# Patient Record
Sex: MALE | Race: WHITE | NOT HISPANIC OR LATINO | Employment: FULL TIME | URBAN - METROPOLITAN AREA
[De-identification: names, ages, dates, MRNs, and addresses within clinical notes are randomized per-mention and may not be internally consistent; named-entity substitution may affect disease eponyms.]

---
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
## 2022-04-01 DIAGNOSIS — L40.0 PLAQUE PSORIASIS: Primary | ICD-10-CM

## 2022-04-01 RX ORDER — IXEKIZUMAB 80 MG/ML
INJECTION, SOLUTION SUBCUTANEOUS
Qty: 2 ML | Refills: 3 | Status: SHIPPED | OUTPATIENT
Start: 2022-04-01 | End: 2023-04-26

## 2022-04-01 NOTE — LETTER
April 1, 2022      Ahmet Hopper  [address] [address] [place] [id number]        To whom it may concern,    We have attempted to schedule Ahmet for a follow up with Dr. Cortez. Unfortunately, we have not been able to reach you. If you would like to schedule an appointment please contact me directly at [phone].    Thank you and hope you are staying well.     Sincerely,  Nancie Page   Pediatric Dermatology Clinic  [phone]     [facility]  eMERGENCY dEPARTMENT eNCOUnter          CHIEF COMPLAINT       Chief Complaint   Patient presents with    Arm Injury     right       Nurses Notes reviewed and I agree except as noted in the HPI. HISTORY OF PRESENT ILLNESS    Xuan Rehman is a 3 y.o. female who presents to the Emergency Department for the evaluation of right arm injury. The patient's mother reported the patient hit her arm on a dresser. The mother reported pain and swelling to the patient's right wrist. The mother reported that patient has continually cried. The patient has medical history of sickle cell trait. The patient has no further acute complaints at this time. REVIEW OF SYSTEMS     Review of Systems   Musculoskeletal: Positive for arthralgias (right wrist) and joint swelling (right wrist). PAST MEDICAL HISTORY    has a past medical history of Sickle cell trait ([facility] [address] SURGICAL HISTORY      has no past surgical history on file. CURRENT MEDICATIONS       Discharge Medication List as of 2/14/2020  7:21 PM      CONTINUE these medications which have NOT CHANGED    Details   acetaminophen (TYLENOL) 160 MG/5ML liquid Take 15 mg/kg by mouth every 4 hours as needed for FeverHistorical Med             ALLERGIES     has No Known Allergies. FAMILY HISTORY     She indicated that her mother is alive. She indicated that her father is alive. family history includes Diabetes in her mother; High Blood Pressure in her mother; Other in her father and mother. SOCIAL HISTORY      reports that she is a non-smoker but has been exposed to tobacco smoke. She has never used smokeless tobacco. She reports that she does not drink alcohol or use drugs. PHYSICAL EXAM     INITIAL VITALS:  weight is 29 lb 2 oz (13.2 kg). Her oral temperature is 98.7 °F (37.1 °C). Her pulse is 120. Her respiration is 20 and oxygen saturation is 97%. Physical Exam  Constitutional:       General: She is active and playful. discharge    PATIENT REFERREDTO:  Alannah De Los Santos [place] [phone]  Go to   tomorrow for walk in clinic      DISCHARGE MEDICATIONS:  Discharge Medication List as of 2/14/2020  7:21 PM          (Please note that portions of this note were completed with a voice recognition program.  Efforts were made to edit the dictations but occasionally words are mis-transcribed.)    The patient was given an opportunity to see the Emergency Attending. The patient voiced understanding that I was a Mid-Level Provider and was in agreement with being seen independently by myself. Scribe:  Anthony Miller 2/14/20 6:55 PM Scribing for and in the presence of Kacie Gilbert.     Signed by: Rosio Bowen, 02/15/20 3:44 PM    Provider:  I personally performed the services described in the documentation, reviewed and edited the documentation which was dictated to the scribe in my presence, and it accurately records my words and actions.     Andres Ding PA-C 2/14/20 3:44 PM    SUJATHA Meyers PA-C  02/15/20 1544

## 2022-04-01 NOTE — TELEPHONE ENCOUNTER
Refill request received from patient's pharmacy for Taltz. Pt was last seen on 10/26/21 with Dr. Cortez, NO follow up scheduled currently. Order pended to Dr. Cortez for review.    RN also sending request to peds derm  to reach out to patient and schedule follow up. Will need labs at that visit.

## 2022-05-02 ENCOUNTER — TELEPHONE (OUTPATIENT)
Dept: DERMATOLOGY | Facility: CLINIC | Age: 22
End: 2022-05-02

## 2022-05-02 NOTE — TELEPHONE ENCOUNTER
Prior Authorization Approval    Authorization Effective Date: 5/3/2022  Authorization Expiration Date: 5/3/2023  Medication: Taltz- Approved  Approved Dose/Quantity: 1 per 28 days  Reference #: [id number]   Insurance Company: [employer] - Phone [phone] Fax [phone]  Expected CoPay:       CoPay Card Available:      Foundation Assistance Needed:    Which Pharmacy is filling the prescription (Not needed for infusion/clinic administered): [facility] - [place] [address]  Pharmacy Notified: Yes  Patient Notified: Yes

## 2022-05-14 ENCOUNTER — HEALTH MAINTENANCE LETTER (OUTPATIENT)
Age: 22
End: 2022-05-14

## 2022-08-09 ENCOUNTER — VIRTUAL VISIT (OUTPATIENT)
Dept: DERMATOLOGY | Facility: CLINIC | Age: 22
End: 2022-08-09
Attending: DERMATOLOGY
Payer: COMMERCIAL

## 2022-08-09 DIAGNOSIS — Z51.81 MEDICATION MONITORING ENCOUNTER: ICD-10-CM

## 2022-08-09 DIAGNOSIS — L40.0 PLAQUE PSORIASIS: Primary | ICD-10-CM

## 2022-08-09 PROCEDURE — 99214 OFFICE O/P EST MOD 30 MIN: CPT | Mod: 95 | Performed by: DERMATOLOGY

## 2022-08-09 NOTE — PATIENT INSTRUCTIONS
[facility]- Pediatric Dermatology  Dr. Cammie Cortez, Dr. Gabriele Abrams, Dr. Iris Alonzo, Dr. Isabella Culver, CHAD Mccoy Dr[address] Dr. Agueda Skelton    Non Urgent  Nurse Triage Line; [phone]- Lillian and Paulette CUELLO Care Coordinators    Nancie (/Complex ) [phone]    If you need a prescription refill, please contact your pharmacy. Refills are approved or denied by our Physicians during normal business hours, Monday through Fridays  Per office policy, refills will not be granted if you have not been seen within the past year (or sooner depending on your child's condition)      Scheduling Information:   Pediatric Appointment Scheduling and Call Center [phone]   Radiology Scheduling- [phone]   Sedation Unit Scheduling- [phone]  Main  Services: [phone]   Sami: [phone]   Pakistani: [phone]   Hmong/Swazi/Gabriel: [phone]    Preadmission Nursing Department Fax Number: [phone] (Fax all pre-operative paperwork to this number)      For urgent matters arising during evenings, weekends, or holidays that cannot wait for normal business hours please call [phone] and ask for the Dermatology Resident On-Call to be paged.

## 2022-08-09 NOTE — PROGRESS NOTES
[facility] (Store and Forward ((National Emergency Concerning the CORONAVIRUS (COVID 19) )    Image quality and interpretability: acceptable    Physician has received verbal consent for a Video/Photos Visit from the patient? Yes    In-person dermatology visit recommendation: no    Dermatology Problem List:    1. Psoriasis vulgaris, no psoriatic arthritis     - Taltz since 4/2021.  Last quant gold was 4/2021   - previously used many topicals  - Prior: methotrexate, Enbrel, Stelara (3/2018-4/2021)    Chief Complaint:  Follow up psoriasis     HPI:  Ahmet is a 21 year old male who I know well for his psoriasis vulgaris. Last seen 10 months ago at which time his skin was clear on Taltz.  He continues to do extremely well without lesions on the skin, in fact he hasn't had an injection in 2 months because he keeps forgetting to give it.     Denies arthritis or any new skin issues    Past Med Hx:  Unchanged    ROS: 12 point ROS is negative     Medications:  Current Outpatient Medications   Medication     fluocinolone acetonide (DERMA-SMOOTHE/FS BODY) 0.01 % external oil     lidocaine (XYLOCAINE) 5 % external ointment     lisdexamfetamine (VYVANSE) 70 MG capsule     TALTZ 80 MG/ML SOAJ auto-injector     clobetasol (TEMOVATE) 0.05 % external solution     ixekizumab (TALTZ) 80 MG/ML SOAJ auto-injector     ixekizumab (TALTZ) 80 MG/ML SOAJ auto-injector     Secukinumab, 300 MG Dose, 150 MG/ML SOAJ     tacrolimus (PROTOPIC) 0.03 % ointment     ustekinumab (STELARA) 45 MG/0.5ML SOSY     ustekinumab (STELARA) 45 MG/0.5ML SOSY     ustekinumab (STELARA) 90 MG/ML     No current facility-administered medications for this visit.         Allergies:  No Known Allergies      Social History:       Physical Exam  There were no vitals taken for this visit.  Psych: alert and oriented, pleasant mood  Skin: exam via photos  Clear scalp, trunk and extremities, face    Assessment and Plan:   1. Psoriasis, plaque type-  dramatic improvement/clearance on Taltz.   2. On biologic therapy/medication monitoring   quant gold, CBC, LFTs are due: ordered today to be done at local lab    Follow up in 1 year, sooner if needed       Cammie Cortez MD  , Pediatric Dermatology    CC: Keis, Mary Ann T  [facility] [address] [place] [address]

## 2022-08-09 NOTE — LETTER
8/9/2022      RE: Ahmet Hopper  [address] [address] [place] [address]     Dear Colleague,    Thank you for the opportunity to participate in the care of your patient, Ahmet Hopper, at the [facility] at [facility]. Please see a copy of my visit note below.                                              [facility] Forward ((National Emergency Concerning the CORONAVIRUS (COVID 19) )    Image quality and interpretability: acceptable    Physician has received verbal consent for a Video/Photos Visit from the patient? Yes    In-person dermatology visit recommendation: no    Dermatology Problem List:    1. Psoriasis vulgaris, no psoriatic arthritis     - Taltz since 4/2021.  Last quant gold was 4/2021   - previously used many topicals  - Prior: methotrexate, Enbrel, Stelara (3/2018-4/2021)    Chief Complaint:  Follow up psoriasis     HPI:  Ahmet is a 21 year old male who I know well for his psoriasis vulgaris. Last seen 10 months ago at which time his skin was clear on Taltz.  He continues to do extremely well without lesions on the skin, in fact he hasn't had an injection in 2 months because he keeps forgetting to give it.     Denies arthritis or any new skin issues    Past Med Hx:  Unchanged    ROS: 12 point ROS is negative     Medications:  Current Outpatient Medications   Medication     fluocinolone acetonide (DERMA-SMOOTHE/FS BODY) 0.01 % external oil     lidocaine (XYLOCAINE) 5 % external ointment     lisdexamfetamine (VYVANSE) 70 MG capsule     TALTZ 80 MG/ML SOAJ auto-injector     clobetasol (TEMOVATE) 0.05 % external solution     ixekizumab (TALTZ) 80 MG/ML SOAJ auto-injector     ixekizumab (TALTZ) 80 MG/ML SOAJ auto-injector     Secukinumab, 300 MG Dose, 150 MG/ML SOAJ     tacrolimus (PROTOPIC) 0.03 % ointment     ustekinumab (STELARA) 45 MG/0.5ML SOSY     ustekinumab  (STELARA) 45 MG/0.5ML SOSY     ustekinumab (STELARA) 90 MG/ML     No current facility-administered medications for this visit.         Allergies:  No Known Allergies      Social History:       Physical Exam  There were no vitals taken for this visit.  Psych: alert and oriented, pleasant mood  Skin: exam via photos  Clear scalp, trunk and extremities, face    Assessment and Plan:   1. Psoriasis, plaque type- dramatic improvement/clearance on Taltz.   2. On biologic therapy/medication monitoring   quant gold, CBC, LFTs are due: ordered today to be done at local lab    Follow up in 1 year, sooner if needed       Cammie Cortez MD  , Pediatric Dermatology    CC: Keis, Mary Ann T  [facility] [address] [place] [address]

## 2022-08-09 NOTE — NURSING NOTE
Ahmet Hopper is a 21 year old male who is being evaluated via a billable telephone visit.      Ahmet Hopper complains of    Chief Complaint   Patient presents with     Teledermatology.     Psoriasis.       Patient is located in [place]? Yes     I have reviewed and updated the patient's medication list, allergies and preferred pharmacy.      Pediatric Dermatology- Review of Systems Questions (return patient)          Goal for today's visit? Follow Up.     IN THE LAST 2 WEEKS     Fever- no     Mouth/Throat Sores- no/no     Weight Gain/Loss - no/no     Cough/Wheezing- yes/no     Change in Appetite- no     Chest Discomfort/Heartburn - no/no     Bone Pain- no     Nausea/Vomiting - no/no     Joint Pain/Swelling - no/no     Constipation/Diarrhea - no/no     Headaches/Dizziness/Change in Vision- no/no/no     Pain with Urination- no     Ear Pain/Hearing Loss- no/no     Nasal Discharge/Bleeding- yes/no     Sadness/Irritability- no/no     Anxiety/Moodiness-no/no       Mitch Calle, [facility]

## 2022-09-04 ENCOUNTER — HEALTH MAINTENANCE LETTER (OUTPATIENT)
Age: 22
End: 2022-09-04

## 2023-01-21 ENCOUNTER — HEALTH MAINTENANCE LETTER (OUTPATIENT)
Age: 23
End: 2023-01-21

## 2023-04-26 DIAGNOSIS — L40.0 PLAQUE PSORIASIS: ICD-10-CM

## 2023-04-26 RX ORDER — IXEKIZUMAB 80 MG/ML
INJECTION, SOLUTION SUBCUTANEOUS
Qty: 2 ML | Refills: 3 | OUTPATIENT
Start: 2023-04-26

## 2023-04-28 ENCOUNTER — TELEPHONE (OUTPATIENT)
Dept: DERMATOLOGY | Facility: CLINIC | Age: 23
End: 2023-04-28
Payer: COMMERCIAL

## 2023-04-28 NOTE — TELEPHONE ENCOUNTER
[facility] Call Center    Phone Message    May a detailed message be left on voicemail: no     Reason for Call: Medication Question or concern regarding medication   Prescription Clarification  Name of Medication: ixekizumab (TALTZ) 80 MG/ML SOAJ auto-injector  Prescribing Provider: Dr Cortez   Pharmacy: [facility] on file   [phone]   What on the order needs clarification? Patient hasn't had this medication filled since 10/2022, does provider want to start patient with loading dose, or just start with maintenance? Please advise. Thanks!          Action Taken: Message routed to:  Other: Peds Derm [facility] [place]    Travel Screening: Not Applicable

## 2023-04-28 NOTE — TELEPHONE ENCOUNTER
PA Initiation    Medication: Taltz  Insurance Company:    Pharmacy Filling the Rx:    Filling Pharmacy Phone:    Filling Pharmacy Fax:    Start Date: 4/28/2023

## 2023-05-02 NOTE — TELEPHONE ENCOUNTER
Unclear if PA renewal request crossed over to plan. Sent request again just to be sure.

## 2023-05-02 NOTE — TELEPHONE ENCOUNTER
Cammie Cortez MD  [facility] Peds Dermatology [facility] 14 hours ago (5:34 PM)     IP  maintenance      Contacted [facility] mail order pharmacy spoke to Allyssa, call transferred to specialty group. Spoke to Best, to provide advisement from Dr. Cortez. Best verbalized understanding and denied questions or concerns.

## 2023-05-04 ENCOUNTER — LAB (OUTPATIENT)
Dept: LAB | Facility: CLINIC | Age: 23
End: 2023-05-04
Payer: COMMERCIAL

## 2023-05-04 DIAGNOSIS — L40.0 PLAQUE PSORIASIS: ICD-10-CM

## 2023-05-04 LAB
ALBUMIN SERPL BCG-MCNC: 4.7 G/DL (ref 3.5–5.2)
ALP SERPL-CCNC: 118 U/L (ref 40–129)
ALT SERPL W P-5'-P-CCNC: 15 U/L (ref 10–50)
AST SERPL W P-5'-P-CCNC: 32 U/L (ref 10–50)
BASOPHILS # BLD AUTO: 0 10E3/UL (ref 0–0.2)
BASOPHILS NFR BLD AUTO: 0 %
BILIRUB DIRECT SERPL-MCNC: <0.2 MG/DL (ref 0–0.3)
BILIRUB SERPL-MCNC: 0.4 MG/DL
EOSINOPHIL # BLD AUTO: 0.1 10E3/UL (ref 0–0.7)
EOSINOPHIL NFR BLD AUTO: 2 %
ERYTHROCYTE [DISTWIDTH] IN BLOOD BY AUTOMATED COUNT: 12 % (ref 10–15)
HCT VFR BLD AUTO: 39.9 % (ref 40–53)
HGB BLD-MCNC: 13.7 G/DL (ref 13.3–17.7)
IMM GRANULOCYTES # BLD: 0 10E3/UL
IMM GRANULOCYTES NFR BLD: 0 %
LYMPHOCYTES # BLD AUTO: 1.8 10E3/UL (ref 0.8–5.3)
LYMPHOCYTES NFR BLD AUTO: 27 %
MCH RBC QN AUTO: 30.6 PG (ref 26.5–33)
MCHC RBC AUTO-ENTMCNC: 34.3 G/DL (ref 31.5–36.5)
MCV RBC AUTO: 89 FL (ref 78–100)
MONOCYTES # BLD AUTO: 0.5 10E3/UL (ref 0–1.3)
MONOCYTES NFR BLD AUTO: 7 %
NEUTROPHILS # BLD AUTO: 4.4 10E3/UL (ref 1.6–8.3)
NEUTROPHILS NFR BLD AUTO: 64 %
PLATELET # BLD AUTO: 195 10E3/UL (ref 150–450)
PROT SERPL-MCNC: 7.3 G/DL (ref 6.4–8.3)
RBC # BLD AUTO: 4.47 10E6/UL (ref 4.4–5.9)
WBC # BLD AUTO: 6.8 10E3/UL (ref 4–11)

## 2023-05-04 PROCEDURE — 85025 COMPLETE CBC W/AUTO DIFF WBC: CPT

## 2023-05-04 PROCEDURE — 86481 TB AG RESPONSE T-CELL SUSP: CPT

## 2023-05-04 PROCEDURE — 36415 COLL VENOUS BLD VENIPUNCTURE: CPT

## 2023-05-04 PROCEDURE — 80076 HEPATIC FUNCTION PANEL: CPT

## 2023-05-06 LAB
QUANTIFERON MITOGEN: 10 IU/ML
QUANTIFERON NIL TUBE: 0.02 IU/ML
QUANTIFERON TB1 TUBE: 0.1 IU/ML
QUANTIFERON TB2 TUBE: 0.12

## 2023-05-07 LAB
GAMMA INTERFERON BACKGROUND BLD IA-ACNC: 0.02 IU/ML
M TB IFN-G BLD-IMP: NEGATIVE
M TB IFN-G CD4+ BCKGRND COR BLD-ACNC: 9.98 IU/ML
MITOGEN IGNF BCKGRD COR BLD-ACNC: 0.08 IU/ML
MITOGEN IGNF BCKGRD COR BLD-ACNC: 0.1 IU/ML

## 2023-08-28 ENCOUNTER — OFFICE VISIT (OUTPATIENT)
Dept: DERMATOLOGY | Facility: CLINIC | Age: 23
End: 2023-08-28
Attending: DERMATOLOGY
Payer: COMMERCIAL

## 2023-08-28 VITALS — HEIGHT: 69 IN | WEIGHT: 118.61 LBS | BODY MASS INDEX: 17.57 KG/M2

## 2023-08-28 DIAGNOSIS — Z79.899 ENCOUNTER FOR LONG-TERM (CURRENT) USE OF HIGH-RISK MEDICATION: Primary | ICD-10-CM

## 2023-08-28 DIAGNOSIS — L40.0 PLAQUE PSORIASIS: ICD-10-CM

## 2023-08-28 DIAGNOSIS — Z51.81 MEDICATION MONITORING ENCOUNTER: ICD-10-CM

## 2023-08-28 PROCEDURE — 99214 OFFICE O/P EST MOD 30 MIN: CPT | Performed by: DERMATOLOGY

## 2023-08-28 ASSESSMENT — PAIN SCALES - GENERAL: PAINLEVEL: NO PAIN (0)

## 2023-08-28 NOTE — NURSING NOTE
"[facility] [[id number]]  Chief Complaint   Patient presents with    RECHECK     [facility] return-follow up for plaque psoriasis      Initial Ht 5' 9\" (175.3 cm)   Wt 118 lb 9.7 oz (53.8 kg)   BMI 17.52 kg/m   Estimated body mass index is 17.52 kg/m  as calculated from the following:    Height as of this encounter: 5' 9\" (175.3 cm).    Weight as of this encounter: 118 lb 9.7 oz (53.8 kg).  Medication Reconciliation: complete    Does the patient need any medication refills today? No    Does the patient/parent need MyChart or Proxy acces today? No    Hope Menendez LPN     "

## 2023-08-28 NOTE — LETTER
"8/28/2023      RE: Ahmet Hopper  [address] [address] [place] [address]     Dear Colleague,    Thank you for the opportunity to participate in the care of your patient, Ahmet Hopper, at the [facility] PEDIATRIC SPECIALTY CLINIC at [facility]. Please see a copy of my visit note below.    [facility] Dermatology Note  Encounter Date: Aug 28, 2023  Office Visit     Dermatology Problem List:  1. Psoriasis    ____________________________________________    Assessment & Plan:    # Psoriasis.   - Continue Taltz (80mg) subcutaneously every 28 days  - Continue to monitor flares at home, return to clinic if flares recur  #medication monitoring  Labs ordered prior to visit: normal      Procedures Performed:   None    Follow-up in the future as needed- plans to transition to adult dermatology closer to home    Staff and Medical Student:     Med student: Libertad Charles     Staff Physician:  I was present with the medical student who participated in the service and in the documentation of the note. I have verified the history and personally performed the physical exam and medical decision making. The encounter documented accurately depicts my evaluation, diagnoses, decisions, treatment and follow-up plans.      Cammie Cortez MD  ,  Pediatric Dermatology    ____________________________________________    CC: RECHECK ([facility] return-follow up for plaque psoriasis )    HPI:  Mr. Ahmet Hopper is a(n) 22 year old male who presents today for follow-up for psoriasis. Patient's psoriasis flared up 3-4 months ago on his scalp. He said he took a break from the Taltz for 4-5 months and the psoriasis flared up on his scalp. He also notes since restarting the Taltz medication, he feels his psoriasis \"is finally getting under control\".Patient stated that that he did not have any other questions today, and would like a " "referral to an adult dermatologist. Dr. Payton Almaguer at [facility] was recommended.     Patient is otherwise feeling well, without additional skin concerns.    I requested blood work prior to this appointment:  5/4/2023: CBC, LFTs, quant gold-normal         Labs:  CBC from 5/4/23 was unremarkable.    Physical Exam:  Vitals: Ht 5' 9\" (175.3 cm)   Wt 53.8 kg (118 lb 9.7 oz)   BMI 17.52 kg/m    SKIN: Focused examination of scalp was performed.  - 1cm scaly patch noted behind left ear  - Scalp otherwise free of redness, lacerations, bruising, lesions  - No other lesions of concern on areas examined.     Medications:  Current Outpatient Medications   Medication    ixekizumab (TALTZ) 80 MG/ML SOAJ auto-injector    lidocaine (XYLOCAINE) 5 % external ointment    clobetasol (TEMOVATE) 0.05 % external solution    fluocinolone acetonide (DERMA-SMOOTHE/FS BODY) 0.01 % external oil    lisdexamfetamine (VYVANSE) 70 MG capsule    tacrolimus (PROTOPIC) 0.03 % ointment     No current facility-administered medications for this visit.          Past Medical History:   Patient Active Problem List   Diagnosis    Psoriasis    Anxiety disorder    Attention deficit hyperactivity disorder (ADHD), combined type    Clinodactyly of finger    Immunocompromised (H)    Major depressive disorder    Other congenital malformations of upper limb(s), including shoulder girdle    Short stature (child)    Slow weight gain in child    Social anxiety disorder    Plaque psoriasis     No past medical history on file.         Please do not hesitate to contact me if you have any questions/concerns.     Sincerely,       Cammieangelito Cortez MD  "

## 2023-08-28 NOTE — PATIENT INSTRUCTIONS
[facility]- Pediatric Dermatology  Dr. Cammie Cortez, Dr. Gabriele Abrams, Dr. Iris Alonzo, Dr. Isabella Culver, CHAD Mccoy Dr., Dr. Agueda Skelton    Non Urgent  Nurse Triage Line; [phone]- Lillian and Paulette CUELLO Care Coordinators    Nancie (/Complex ) [phone]    If you need a prescription refill, please contact your pharmacy. Refills are approved or denied by our Physicians during normal business hours, Monday through Fridays  Per office policy, refills will not be granted if you have not been seen within the past year (or sooner depending on your child's condition)      Scheduling Information:   Pediatric Appointment Scheduling and Call Center [phone]   Radiology Scheduling- [phone]   Sedation Unit Scheduling- [phone]  Main  Services: [phone]   Tajik: [phone]   Colombian: [phone]   Hmong/Malaysian/Gabriel: [phone]    Preadmission Nursing Department Fax Number: [phone] (Fax all pre-operative paperwork to this number)      For urgent matters arising during evenings, weekends, or holidays that cannot wait for normal business hours please call [phone] and ask for the Dermatology Resident On-Call to be paged.       Payton Almaguer MD  [facility]  [phone]

## 2023-08-28 NOTE — PROGRESS NOTES
"[facility] Dermatology Note  Encounter Date: Aug 28, 2023  Office Visit     Dermatology Problem List:  1. Psoriasis    ____________________________________________    Assessment & Plan:    # Psoriasis.   - Continue Taltz (80mg) subcutaneously every 28 days  - Continue to monitor flares at home, return to clinic if flares recur  #medication monitoring  Labs ordered prior to visit: normal        Procedures Performed:   None    Follow-up in the future as needed- plans to transition to adult dermatology closer to home    Staff and Medical Student:     Med student: Libertad Charles     Staff Physician:  I was present with the medical student who participated in the service and in the documentation of the note. I have verified the history and personally performed the physical exam and medical decision making. The encounter documented accurately depicts my evaluation, diagnoses, decisions, treatment and follow-up plans.      Cammie Cortez MD  ,  Pediatric Dermatology    ____________________________________________    CC: RECHECK ([facility] return-follow up for plaque psoriasis )    HPI:  Mr. Ahmet Hopper is a(n) 22 year old male who presents today for follow-up for psoriasis. Patient's psoriasis flared up 3-4 months ago on his scalp. He said he took a break from the Taltz for 4-5 months and the psoriasis flared up on his scalp. He also notes since restarting the Taltz medication, he feels his psoriasis \"is finally getting under control\".Patient stated that that he did not have any other questions today, and would like a referral to an adult dermatologist. Dr. Payton Almaguer at [facility] was recommended.     Patient is otherwise feeling well, without additional skin concerns.    I requested blood work prior to this appointment:  5/4/2023: CBC, LFTs, quant gold-normal         Labs:  CBC from 5/4/23 was unremarkable.    Physical Exam:  Vitals: Ht 5' 9\" (175.3 cm)   Wt 53.8 " kg (118 lb 9.7 oz)   BMI 17.52 kg/m    SKIN: Focused examination of scalp was performed.  - 1cm scaly patch noted behind left ear  - Scalp otherwise free of redness, lacerations, bruising, lesions  - No other lesions of concern on areas examined.     Medications:  Current Outpatient Medications   Medication    ixekizumab (TALTZ) 80 MG/ML SOAJ auto-injector    lidocaine (XYLOCAINE) 5 % external ointment    clobetasol (TEMOVATE) 0.05 % external solution    fluocinolone acetonide (DERMA-SMOOTHE/FS BODY) 0.01 % external oil    lisdexamfetamine (VYVANSE) 70 MG capsule    tacrolimus (PROTOPIC) 0.03 % ointment     No current facility-administered medications for this visit.          Past Medical History:   Patient Active Problem List   Diagnosis    Psoriasis    Anxiety disorder    Attention deficit hyperactivity disorder (ADHD), combined type    Clinodactyly of finger    Immunocompromised (H)    Major depressive disorder    Other congenital malformations of upper limb(s), including shoulder girdle    Short stature (child)    Slow weight gain in child    Social anxiety disorder    Plaque psoriasis     No past medical history on file.

## 2024-02-18 ENCOUNTER — HEALTH MAINTENANCE LETTER (OUTPATIENT)
Age: 24
End: 2024-02-18

## 2024-04-29 DIAGNOSIS — L40.0 PLAQUE PSORIASIS: ICD-10-CM

## 2024-04-30 RX ORDER — IXEKIZUMAB 80 MG/ML
80 INJECTION, SOLUTION SUBCUTANEOUS
Qty: 0.1 ML | Refills: 0 | OUTPATIENT
Start: 2024-04-30

## 2024-04-30 NOTE — TELEPHONE ENCOUNTER
"Contacted pt this am,RN inquired if he had transferred care. Pt stated, \"I did. Can you fill out an JESUS for me?\" RN explained to pt he would need to complete, RN provided pt with release of information phone number to call and get records transferred to new clinic. RN explained to pt we would cancel the refill request and he would either need to call his new providers office to have the refills sent to the pharmacy or reach out to the pharmacy to have the refill request transferred to the new providers office. Pt was agreeable and denied questions.   "

## 2025-03-09 ENCOUNTER — HEALTH MAINTENANCE LETTER (OUTPATIENT)
Age: 25
End: 2025-03-09